# Patient Record
Sex: MALE | Race: BLACK OR AFRICAN AMERICAN | Employment: UNEMPLOYED | URBAN - METROPOLITAN AREA
[De-identification: names, ages, dates, MRNs, and addresses within clinical notes are randomized per-mention and may not be internally consistent; named-entity substitution may affect disease eponyms.]

---

## 2018-12-27 ENCOUNTER — HOSPITAL ENCOUNTER (EMERGENCY)
Age: 58
Discharge: HOME | End: 2018-12-27
Attending: STUDENT IN AN ORGANIZED HEALTH CARE EDUCATION/TRAINING PROGRAM
Payer: MEDICAID

## 2018-12-27 VITALS
OXYGEN SATURATION: 98 % | HEIGHT: 66 IN | SYSTOLIC BLOOD PRESSURE: 146 MMHG | BODY MASS INDEX: 41.78 KG/M2 | RESPIRATION RATE: 18 BRPM | DIASTOLIC BLOOD PRESSURE: 82 MMHG | HEART RATE: 71 BPM | TEMPERATURE: 98.1 F | WEIGHT: 260 LBS

## 2018-12-27 DIAGNOSIS — B02.9 HERPES ZOSTER WITHOUT COMPLICATION: Primary | ICD-10-CM

## 2018-12-27 PROCEDURE — 99282 EMERGENCY DEPT VISIT SF MDM: CPT | Performed by: NURSE PRACTITIONER

## 2018-12-27 RX ORDER — TRAMADOL HYDROCHLORIDE 50 MG/1
50-100 TABLET ORAL
Qty: 20 TAB | Refills: 0 | Status: SHIPPED | OUTPATIENT
Start: 2018-12-27

## 2018-12-27 RX ORDER — VALACYCLOVIR HYDROCHLORIDE 1 G/1
1000 TABLET, FILM COATED ORAL 3 TIMES DAILY
Qty: 21 TAB | Refills: 0 | Status: SHIPPED | OUTPATIENT
Start: 2018-12-27 | End: 2019-01-03

## 2018-12-27 NOTE — ED NOTES
I have reviewed discharge instructions with the patient. The patient verbalized understanding. Patient left ED via Discharge Method: wheelchair to Home with self. Opportunity for questions and clarification provided. Patient given 2 scripts. To continue your aftercare when you leave the hospital, you may receive an automated call from our care team to check in on how you are doing. This is a free service and part of our promise to provide the best care and service to meet your aftercare needs.  If you have questions, or wish to unsubscribe from this service please call [phone]. Thank you for Choosing our [facility] Emergency Department.

## 2018-12-27 NOTE — DISCHARGE INSTRUCTIONS
Take the valtrex as prescribed until gone. Use the ultram as prescribed as needed for severe pain. Follow-up with your pain management specialist and/or your primary physician as needed for further evaluation and treatment. Return to the emergency department if you have any new or worsened symptoms, including spreading rash, vision changes, difficulty breathing, fever, or numbness or tingling. Shingles: Care Instructions  Your Care Instructions    Shingles (herpes zoster) causes pain and a blistered rash. The rash can appear anywhere on the body but will be on only one side of the body, the left or right. It will be in a band, a strip, or a small area. The pain can be very severe. Shingles can also cause tingling or itching in the area of the rash. The blisters scab over after a few days and heal in 2 to 4 weeks. Medicines can help you feel better and may help prevent more serious problems caused by shingles. Shingles is caused by the same virus that causes chickenpox. When you have chickenpox, the virus gets into your nerve roots and stays there (becomes dormant) long after you get over the chickenpox. If the virus becomes active again, it can cause shingles. Follow-up care is a key part of your treatment and safety. Be sure to make and go to all appointments, and call your doctor if you are having problems. It's also a good idea to know your test results and keep a list of the medicines you take. How can you care for yourself at home? · Be safe with medicines. Take your medicines exactly as prescribed. Call your doctor if you think you are having a problem with your medicine. Antiviral medicine helps you get better faster. · Try not to scratch or pick at the blisters. They will crust over and fall off on their own if you leave them alone. · Put cool, wet cloths on the area to relieve pain and itching. You can also use calamine lotion.  Try not to use so much lotion that it cakes and is hard to get off.  · Put cornstarch or baking soda on the sores to help dry them out so they heal faster. · Do not use thick ointment, such as petroleum jelly, on the sores. This will keep them from drying and healing. · To help remove loose crusts, soak them in tap water. This can help decrease oozing, and dry and soothe the skin. · Take an over-the-counter pain medicine, such as acetaminophen (Tylenol), ibuprofen (Advil, Motrin), or naproxen (Aleve). Read and follow all instructions on the label. · Avoid close contact with people until the blisters have healed. It is very important for you to avoid contact with anyone who has never had chickenpox or the chickenpox vaccine. Pregnant women, young babies, and anyone else who has a hard time fighting infection (such as someone with HIV, diabetes, or cancer) is especially at risk. When should you call for help? Call your doctor now or seek immediate medical care if:    · You have a new or higher fever.     · You have a severe headache and a stiff neck.     · You lose the ability to think clearly.     · The rash spreads to your forehead, nose, eyes, or eyelids.     · You have eye pain, or your vision gets worse.     · You have new pain in your face, or you cannot move the muscles in your face.     · Blisters spread to new parts of your body.    Watch closely for changes in your health, and be sure to contact your doctor if:    · The rash has not healed after 2 to 4 weeks.     · You still have pain after the rash has healed. Where can you learn more? Go to http://mike-johanna.info/. Saadia Confer in the search box to learn more about \"Shingles: Care Instructions. \"  Current as of: November 18, 2017  Content Version: 11.8  © 7777-4952 [employer]. Care instructions adapted under license by [employer] (which disclaims liability or warranty for this information).  If you have questions about a medical condition or this instruction, always ask your healthcare professional. [place] [address] any warranty or liability for your use of this information.

## 2018-12-27 NOTE — ED PROVIDER NOTES
The patient presents to the ED with complaint of itchy, painful rash to his left anterior and lateral trunk onset 3 days ago. States that the rash has been slowly spreading and worsening since onset. States painful to touch, but intensely itchy. Denies fever. Denies vision changes. The history is provided by the patient. No  was used. Rash    This is a new problem. The current episode started more than 2 days ago. The problem has been gradually worsening. The problem is associated with nothing. There has been no fever. The rash is present on the trunk. The pain is at a severity of 5/10. The pain is moderate. The pain has been constant since onset. Associated symptoms include itching and pain. He has tried nothing for the symptoms. The treatment provided no relief. Past Medical History:   Diagnosis Date    Diabetes ([facility] [address]     Hypertension     Infectious disease     HIV positive    Other ill-defined conditions(799.89)     neuropathy    Primary osteoarthritis involving multiple joints 8/1/2016    Sleep disorder     apnea       Past Surgical History:   Procedure Laterality Date    HX OTHER SURGICAL      pt states he has had surgery on rectum but does not remember why.           Family History:   Problem Relation Age of Onset    Cancer Father         lung    Diabetes Mother     Hypertension Mother        Social History     Socioeconomic History    Marital status: SINGLE     Spouse name: Not on file    Number of children: Not on file    Years of education: Not on file    Highest education level: Not on file   Social Needs    Financial resource strain: Not on file    Food insecurity - worry: Not on file    Food insecurity - inability: Not on file   [employer] needs - medical: Not on file   [employer] needs - non-medical: Not on file   Occupational History    Not on file   Tobacco Use    Smoking status: Current Every Day Smoker     Packs/day: 2.00 Types: Cigars    Smokeless tobacco: Never Used   Substance and Sexual Activity    Alcohol use: Yes     Alcohol/week: 0.0 oz     Comment: occationally    Drug use: No    Sexual activity: No   Other Topics Concern    Not on file   Social History Narrative    Lives alone. On disability. Has a grown daughter. ALLERGIES: Patient has no known allergies. Review of Systems   Constitutional: Negative for chills and fever. Respiratory: Negative for cough, chest tightness and shortness of breath. Musculoskeletal: Negative for arthralgias and neck pain. Skin: Positive for itching and rash. Negative for wound. Neurological: Negative for dizziness, weakness and numbness. Vitals:    12/27/18 0935   BP: 146/82   Pulse: 71   Resp: 18   Temp: 98.1 °F (36.7 °C)   SpO2: 98%   Weight: 117.9 kg (260 lb)   Height: 5' 6\" (1.676 m)            Physical Exam   Constitutional: He appears well-developed and well-nourished. No distress. HENT:   Head: Normocephalic and atraumatic. Pulmonary/Chest: Effort normal. No respiratory distress. Neurological: He is alert. Skin: Skin is warm and dry. Capillary refill takes less than 2 seconds. Rash noted. There is erythema. Vesicular rash noted in dermatomal distribution along left side. It does not cross midline. Painful to touch. No fluctuance, but mildly erythematous around the rash. MDM  Number of Diagnoses or Management Options  Herpes zoster without complication: new and does not require workup  Diagnosis management comments: Shingles appearing rash, and I will treat with valacyclovir given the patient's immunocompromised status. I will also provide the patient some pain management with mild narcotic therapy, and recommended that he follow-up with his pain management specialist.  I discussed the plan of care with the patient, and he voiced his understanding and compliance.     Risk of Complications, Morbidity, and/or Mortality  Presenting problems: low  Diagnostic procedures: minimal  Management options: moderate    Patient Progress  Patient progress: stable         Procedures             Portions of this chart were completed using a voice-to-text system, MADELEINE Gray 21, and while I made efforts to eliminate or reduce dictation errors, please excuse any existing errors in dictation.